# Patient Record
Sex: MALE | Race: WHITE
[De-identification: names, ages, dates, MRNs, and addresses within clinical notes are randomized per-mention and may not be internally consistent; named-entity substitution may affect disease eponyms.]

---

## 2017-09-06 ENCOUNTER — HOSPITAL ENCOUNTER (OUTPATIENT)
Dept: HOSPITAL 45 - C.ULTR | Age: 52
Discharge: HOME | End: 2017-09-06
Attending: FAMILY MEDICINE
Payer: COMMERCIAL

## 2017-09-06 DIAGNOSIS — I82.811: ICD-10-CM

## 2017-09-06 DIAGNOSIS — R20.0: Primary | ICD-10-CM

## 2017-09-06 NOTE — DIAGNOSTIC IMAGING REPORT
BILATERAL LOWER EXTREMITY VENOUS DOPPLER



CLINICAL HISTORY: Bilateral leg numbness.    



COMPARISON STUDY:  Bilateral lower extremity venous Doppler May 2, 2017. 



TECHNIQUE:  Sonography of the deep venous system of the bilateral lower

extremities was performed. Compression and augmentation were evaluated.



FINDINGS:  The right common femoral, superficial femoral and popliteal veins are

patent. There is minimal thrombus within the right peroneal vein which was shown

on exam of May 2, 2017. This likely reflects chronic thrombus. There is no

evidence for acute deep venous thrombus within the right lower extremity. There

is extensive occlusive superficial thrombus within the right greater saphenous

vein which extends from the level of the distal calf to distal thigh. This was

shown on exam of May 2, 2017. There is a right popliteal cyst.



There is no deep venous thrombus within the left lower extremity. Note is made

of superficial thrombus within a superficial vein of the mid to distal left

calf. This is also shown on exam of May 2, 2017.



IMPRESSION:



1. No evidence for acute deep venous thrombus within either lower extremity.



2. Extensive superficial thrombus within the right greater saphenous vein and

small amount of superficial thrombus within the left calf. Similar findings were

shown on exam of May 2, 2017. The findings are age indeterminate but probably

chronic.



3. Small amount of thrombus within the right peroneal vein which is unchanged

and likely chronic.







Electronically signed by:  Rony Andrews M.D.

9/6/2017 9:18 PM



Dictated Date/Time:  9/6/2017 9:13 PM

## 2017-11-22 ENCOUNTER — HOSPITAL ENCOUNTER (OUTPATIENT)
Dept: HOSPITAL 45 - C.CTS | Age: 52
Discharge: HOME | End: 2017-11-22
Attending: INTERNAL MEDICINE
Payer: COMMERCIAL

## 2017-11-22 DIAGNOSIS — Z86.711: ICD-10-CM

## 2017-11-22 DIAGNOSIS — Z09: Primary | ICD-10-CM

## 2017-11-22 NOTE — DIAGNOSTIC IMAGING REPORT
CT ANGIOGRAM OF THE CHEST



CLINICAL HISTORY: Pulmonary embolism. Follow-up study.    



COMPARISON STUDY:  May 2, 2017 



TECHNIQUE: Following the IV administration of 115 mL of Optiray-320, CT

angiogram of the thorax was performed from the thoracic inlet to the lung bases

utilizing the pulmonary embolus protocol. Images are reviewed in the axial,

sagittal, and coronal planes. IV contrast was administered without complication.

MIP imaging was performed.  A dose lowering technique was utilized adhering to

the principles of ALARA.





CT DOSE: 402.96 mGy.cm



FINDINGS:



No pathologically enlarged axillary mediastinal or hilar lymph nodes were

visualized.



There was no evidence of thoracic aortic dilatation.



There were no pulmonary artery filling defects to indicate acute pulmonary

embolism.



No pleural effusions are visualized.



The study is degraded due to respiratory motion artifact. There are dependent

atelectatic changes. There is no lobar consolidation. There is a stable 6 mm

solid pulmonary nodule within the lingula.



IMPRESSION:  

1. No evidence of pulmonary embolism

2. Stable 6 mm solid pulmonary nodule within the lingula. A 12 month follow-up

CT scan is recommended.



Please refer to below summary of Fleischner criteria recommendations for

follow-up of incidental CT nodules (MY Stahl, Guidelines for management of

small pulmonary nodules detected on CT scans:  A statement from the Fleischner

Society, Radiology 237: [phone].)



SOLID NODULES



Solitary nodule size: <6 mm

*  low risk patients:  no follow-up needed

*  high risk patients:  optional CT at 12 months



Solitary nodule size:  6-8 mm

*  low risk patients:  follow-up at 6-12 months, then consider further follow-up

at 18-24 months

*  high risk patients:  initial follow-up CT at 6-12 months and then at 18-24

months if no change



Solitary nodule size: >8 mm

*  either low or high risk patients - consider follow-up CT at 3 months, and/or

CT-PET, and/or biopsy



Multiple nodules size:  <6 mm

*  low risk patients:  no routine follow-up

*  high risk patients:  optional CT at 12 months



Multiple nodules size:  6-8 mm

*  low risk patients:  follow-up at 3-6 months, then consider further follow-up

at 18-24 months

*  high risk patients:  follow-up at 3-6 months, then at 18-24 months if no

change



Multiple nodules size:  >8 mm

*  low risk patients:  follow-up at 3-6 months, then consider further follow-up

at 18-24 months

*  high risk patients:  follow-up at 3-6 months, then at 18-24 months if no

change



Note:  newly detected indeterminate nodule in persons 35 years of age or older.

*  low risk patients:  minimal or absent history of smoking and/or other known

risk factors

*  high risk patients:  history of smoking or of other known risk factors (e.g.

first degree relative with lung cancer, or exposure to asbestos, radon, uranium)

*  if a nodule up to 8 mm is partly solid or is ground glass further follow-up

is required after 24 months to exclude possible slow growing adenocarcinoma

(ALBERT)



SUBSOLID NODULES



Solitary pure ground-glass nodule

*  nodule size <6 mm - no CT follow-up required

*  nodule size >=6 mm - follow-up CT at 6-12 months, then every 2 years until 5

years



Solitary part-solid nodule

*  nodule size <6 mm - no CT follow-up required

*  nodule size >=6 mm - follow-up CT at 3-6 months.  If unchanged, and solid

component remains <6 mm, then annual follow-up for 5 years



Multiple subsolid nodules

*  nodule size <6 mm - follow-up CT at 3-6 months, consider further follow-up at

2 and 4 years if stable

*  nodule size >=6 mm - follow-up CT at 3-6 months, subsequent management based

on the most suspicious nodule(s)







       







Electronically signed by:  Abhinav Lyman M.D.

11/22/2017 8:28 AM



Dictated Date/Time:  11/22/2017 8:07 AM

## 2017-12-18 ENCOUNTER — HOSPITAL ENCOUNTER (OUTPATIENT)
Dept: HOSPITAL 45 - C.RAD1850 | Age: 52
Discharge: HOME | End: 2017-12-18
Attending: FAMILY MEDICINE
Payer: COMMERCIAL

## 2017-12-18 DIAGNOSIS — R42: ICD-10-CM

## 2017-12-18 DIAGNOSIS — R76.11: ICD-10-CM

## 2017-12-18 DIAGNOSIS — M79.605: Primary | ICD-10-CM

## 2017-12-18 NOTE — DIAGNOSTIC IMAGING REPORT
L HIP UNILATERAL 2 VIEWS



HISTORY:  52 years-old Male M79.605,R42,R76.11 acute left hip pain without

reported trauma



COMPARISON: Sacrum and coccyx radiographs 1/22/2008



TECHNIQUE: 2 views of the left hip



FINDINGS: 

Mild degenerative changes of the left femoral acetabular joint. No acute

fracture or dislocation. There are phleboliths of the left hemipelvis.



IMPRESSION: Mild degenerative changes without acute fracture or subluxation. 







The above report was generated using voice recognition software. It may contain

grammatical, syntax or spelling errors.







Electronically signed by:  Maulik Fox M.D.

12/18/2017 12:19 PM



Dictated Date/Time:  12/18/2017 12:18 PM

## 2017-12-29 ENCOUNTER — HOSPITAL ENCOUNTER (EMERGENCY)
Dept: HOSPITAL 45 - C.EDB | Age: 52
Discharge: HOME | End: 2017-12-29
Payer: COMMERCIAL

## 2017-12-29 VITALS — SYSTOLIC BLOOD PRESSURE: 126 MMHG | HEART RATE: 65 BPM | DIASTOLIC BLOOD PRESSURE: 72 MMHG

## 2017-12-29 VITALS
BODY MASS INDEX: 28.4 KG/M2 | BODY MASS INDEX: 28.4 KG/M2 | HEIGHT: 77.01 IN | WEIGHT: 240.52 LBS | HEIGHT: 77.01 IN | WEIGHT: 240.52 LBS

## 2017-12-29 VITALS — OXYGEN SATURATION: 95 %

## 2017-12-29 VITALS — TEMPERATURE: 97.34 F

## 2017-12-29 VITALS — OXYGEN SATURATION: 98 %

## 2017-12-29 DIAGNOSIS — M79.605: Primary | ICD-10-CM

## 2017-12-29 DIAGNOSIS — Z79.82: ICD-10-CM

## 2017-12-29 DIAGNOSIS — Z79.899: ICD-10-CM

## 2017-12-29 DIAGNOSIS — Z98.890: ICD-10-CM

## 2017-12-29 DIAGNOSIS — E03.9: ICD-10-CM

## 2017-12-29 DIAGNOSIS — Z86.718: ICD-10-CM

## 2017-12-29 DIAGNOSIS — R07.9: ICD-10-CM

## 2017-12-29 DIAGNOSIS — Z86.711: ICD-10-CM

## 2017-12-29 LAB
ALBUMIN SERPL-MCNC: 3.9 GM/DL (ref 3.4–5)
ALP SERPL-CCNC: 79 U/L (ref 45–117)
ALT SERPL-CCNC: 34 U/L (ref 12–78)
AST SERPL-CCNC: 23 U/L (ref 15–37)
BASOPHILS # BLD: 0.05 K/UL (ref 0–0.2)
BASOPHILS NFR BLD: 0.8 %
BUN SERPL-MCNC: 17 MG/DL (ref 7–18)
CALCIUM SERPL-MCNC: 9.1 MG/DL (ref 8.5–10.1)
CK MB SERPL-MCNC: 0.9 NG/ML (ref 0.5–3.6)
CO2 SERPL-SCNC: 26 MMOL/L (ref 21–32)
CREAT SERPL-MCNC: 1.22 MG/DL (ref 0.6–1.4)
EOS ABS #: 0.55 K/UL (ref 0–0.5)
EOSINOPHIL NFR BLD AUTO: 204 K/UL (ref 130–400)
GLUCOSE SERPL-MCNC: 106 MG/DL (ref 70–99)
HCT VFR BLD CALC: 45.2 % (ref 42–52)
HGB BLD-MCNC: 15.8 G/DL (ref 14–18)
IG#: 0.01 K/UL (ref 0–0.02)
IMM GRANULOCYTES NFR BLD AUTO: 27.5 %
INR PPP: 1 (ref 0.9–1.1)
LYMPHOCYTES # BLD: 1.71 K/UL (ref 1.2–3.4)
MCH RBC QN AUTO: 31.6 PG (ref 25–34)
MCHC RBC AUTO-ENTMCNC: 35 G/DL (ref 32–36)
MCV RBC AUTO: 90.4 FL (ref 80–100)
MONO ABS #: 0.46 K/UL (ref 0.11–0.59)
MONOCYTES NFR BLD: 7.4 %
NEUT ABS #: 3.44 K/UL (ref 1.4–6.5)
NEUTROPHILS # BLD AUTO: 8.8 %
NEUTROPHILS NFR BLD AUTO: 55.3 %
PMV BLD AUTO: 10 FL (ref 7.4–10.4)
POTASSIUM SERPL-SCNC: 3.7 MMOL/L (ref 3.5–5.1)
PROT SERPL-MCNC: 7.7 GM/DL (ref 6.4–8.2)
PTT PATIENT: 26 SECONDS (ref 21–31)
RED CELL DISTRIBUTION WIDTH CV: 13.5 % (ref 11.5–14.5)
RED CELL DISTRIBUTION WIDTH SD: 44.4 FL (ref 36.4–46.3)
SODIUM SERPL-SCNC: 139 MMOL/L (ref 136–145)
WBC # BLD AUTO: 6.22 K/UL (ref 4.8–10.8)

## 2017-12-29 NOTE — DIAGNOSTIC IMAGING REPORT
(CHEST FOR PE) ANGIO WITH



CT DOSE: 447.74 mGy.cm



HISTORY: Chest pain  dyspnea



TECHNIQUE: Multiaxial CT images of the chest were performed following the

intravenous administration of contrast to evaluate the pulmonary arteries.

Maximal intensity projection images were also obtained.  A dose lowering

technique was utilized adhering to the principles of ALARA.





COMPARISON STUDY: 11/22/2017



FINDINGS: There is a normal caliber thoracic aorta with no evidence for

dissection. There is no evidence for pulmonary embolus. No pleural effusions. No

pneumothorax. The liver and spleen are unremarkable. No mediastinal or hilar

lymphadenopathy. The central airways are patent. The lungs are clear. This study

is unchanged from the prior exam. No evidence for pulmonary embolus. Stable 6 mm

nodule of the lingula. No focal infiltrate.



IMPRESSION: 



1. Study is negative for pulmonary embolus.





2. Lungs are grossly clear.

3. Stable 6 mm nodule of the lingula 











The above report was generated using voice recognition software.  It may contain

grammatical, syntax or spelling errors.







Electronically signed by:  Xavier Lubin M.D.

12/29/2017 7:20 PM



Dictated Date/Time:  12/29/2017 7:19 PM
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
## 2017-12-29 NOTE — DIAGNOSTIC IMAGING REPORT
CHEST ONE VIEW PORTABLE



CLINICAL HISTORY: 52 years-old Male presenting with chest pain. 



TECHNIQUE: Portable upright AP view of the chest was obtained.



COMPARISON: CT from 11/22/2017 and chest x-ray from 5/2/2017.



FINDINGS:

Atherosclerosis of the aortic arch. Cardiac silhouette normal in size. Lungs and

pleural spaces clear. Degenerative changes of the thoracic spine. Upper abdomen

normal.



IMPRESSION:

1.  No acute cardiopulmonary disease.







Electronically signed by:  Vikas Maldonado M.D.

12/29/2017 5:03 PM



Dictated Date/Time:  12/29/2017 5:01 PM

## 2017-12-29 NOTE — DIAGNOSTIC IMAGING REPORT
VENOUS DOPPLER LWR EXT BILA



HISTORY: Pain. Edema.  L and R leg pain, hx DVT and PE



COMPARISON STUDY:  9/6/2017



FINDINGS: Chronic superficial thrombophlebitis unchanged in distribution

compared to the prior study. All major deep venous structures are intact.



IMPRESSION:  

No evidence for acute deep venous thrombosis. Chronic superficial

thrombophlebitis unchanged from the prior exam.









The above report was generated using voice recognition software.  It may contain

grammatical, syntax or spelling errors.







Electronically signed by:  Xavier Lubin M.D.

12/29/2017 6:35 PM



Dictated Date/Time:  12/29/2017 6:34 PM

## 2018-01-17 ENCOUNTER — HOSPITAL ENCOUNTER (EMERGENCY)
Dept: HOSPITAL 45 - C.EDB | Age: 53
Discharge: HOME | End: 2018-01-17
Payer: COMMERCIAL

## 2018-01-17 VITALS
HEIGHT: 77.01 IN | WEIGHT: 241.85 LBS | BODY MASS INDEX: 28.56 KG/M2 | WEIGHT: 241.85 LBS | HEIGHT: 77.01 IN | BODY MASS INDEX: 28.56 KG/M2

## 2018-01-17 VITALS — SYSTOLIC BLOOD PRESSURE: 129 MMHG | OXYGEN SATURATION: 94 % | DIASTOLIC BLOOD PRESSURE: 84 MMHG | HEART RATE: 81 BPM

## 2018-01-17 VITALS — TEMPERATURE: 97.7 F

## 2018-01-17 DIAGNOSIS — E03.9: ICD-10-CM

## 2018-01-17 DIAGNOSIS — Z98.890: ICD-10-CM

## 2018-01-17 DIAGNOSIS — Z86.718: ICD-10-CM

## 2018-01-17 DIAGNOSIS — Z79.899: ICD-10-CM

## 2018-01-17 DIAGNOSIS — I80.9: Primary | ICD-10-CM
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
## 2018-01-17 NOTE — DIAGNOSTIC IMAGING REPORT
RIGHT LOWER EXTREMITY VENOUS DOPPLER



CLINICAL HISTORY: Right lower extremity pain.    



COMPARISON STUDY:  Bilateral lower extremity venous Doppler December 29, 2017. 



TECHNIQUE:  Sonography of the deep venous system of the right lower extremity

was performed. Compression and augmentation were evaluated.



FINDINGS:  The right common femoral, superficial femoral and popliteal veins

were compressible. Augmentation was normal. Flow was shown within the deep calf

vessels. Superficial thrombus is again noted within the right greater saphenous

vein within the mid to distal right calf. This is similar to exam of December 29, 2017.



IMPRESSION: 



1. No evidence of deep venous thrombus within the right lower extremity.



2. No significant change in superficial thrombus within the right greater

saphenous vein since exam of December 29, 2017.







Electronically signed by:  Rony Andrews M.D.

1/17/2018 9:53 AM



Dictated Date/Time:  1/17/2018 9:47 AM